# Patient Record
Sex: FEMALE | Race: WHITE | NOT HISPANIC OR LATINO | URBAN - NONMETROPOLITAN AREA
[De-identification: names, ages, dates, MRNs, and addresses within clinical notes are randomized per-mention and may not be internally consistent; named-entity substitution may affect disease eponyms.]

---

## 2022-11-28 ENCOUNTER — INPATIENT HOSPITAL (OUTPATIENT)
Dept: URBAN - NONMETROPOLITAN AREA HOSPITAL 27 | Facility: HOSPITAL | Age: 37
End: 2022-11-28
Payer: COMMERCIAL

## 2022-11-28 DIAGNOSIS — A09 INFECTIOUS GASTROENTERITIS AND COLITIS, UNSPECIFIED: ICD-10-CM

## 2022-11-28 PROCEDURE — 99223 1ST HOSP IP/OBS HIGH 75: CPT | Performed by: INTERNAL MEDICINE

## 2022-11-29 PROCEDURE — 99232 SBSQ HOSP IP/OBS MODERATE 35: CPT | Performed by: INTERNAL MEDICINE

## 2023-01-24 ENCOUNTER — OFFICE (OUTPATIENT)
Dept: URBAN - NONMETROPOLITAN AREA CLINIC 5 | Facility: CLINIC | Age: 38
End: 2023-01-24

## 2023-01-24 VITALS
HEART RATE: 59 BPM | DIASTOLIC BLOOD PRESSURE: 75 MMHG | SYSTOLIC BLOOD PRESSURE: 143 MMHG | HEIGHT: 67 IN | WEIGHT: 228 LBS | RESPIRATION RATE: 20 BRPM

## 2023-01-24 DIAGNOSIS — A09 INFECTIOUS GASTROENTERITIS AND COLITIS, UNSPECIFIED: ICD-10-CM

## 2023-01-24 DIAGNOSIS — R93.5 ABNORMAL FINDINGS ON DIAGNOSTIC IMAGING OF OTHER ABDOMINAL R: ICD-10-CM

## 2023-01-24 PROCEDURE — 99213 OFFICE O/P EST LOW 20 MIN: CPT | Performed by: INTERNAL MEDICINE

## 2023-01-24 NOTE — SERVICENOTES
we had a thorough discussion about my initial concern that this could represent inflammatory bowel disease such as Crohn's disease.  Given the fact that she is now asymptomatic and has had no further episodes of abdominal pain with nausea vomiting, I think we should simply watch this for now.  She knows to call this office should she have recurrent symptoms with the above plan.

## 2023-01-24 NOTE — SERVICEHPINOTES
Jade Matos   is a   37   year old  female   who is here today for routine follow up. the patient is a 37-year-old white female who I saw in the hospital on 11/27/2022 after she presented with acute onset of lower abdominal crampy pain with nausea and vomiting.  Her daughter had an acute gastroenteritis type illness at the same time.  Her presentation was similar to that which occurred back on 06/05/2022 when she was admitted with similar symptoms and CT scan findings suggesting some thickening in the wall of the small intestine.  Her CT done more recently on 11/28/2022 also revealed some diffuse thickening in the wall of the jejunum.  Symptoms resolved completely with supportive care.  She came today for follow-up and is feeling well has had no further spells of abdominal pain, nausea, or vomiting.  She has no weight loss and has a good appetite.  She has seen no blood in the stool and  has no diarrhea.    She has 1-2 bowel movements a day and stools are occasionally somewhat loose.